# Patient Record
Sex: MALE | Race: WHITE | NOT HISPANIC OR LATINO | Employment: FULL TIME | URBAN - METROPOLITAN AREA
[De-identification: names, ages, dates, MRNs, and addresses within clinical notes are randomized per-mention and may not be internally consistent; named-entity substitution may affect disease eponyms.]

---

## 2017-05-24 ENCOUNTER — TELEPHONE (OUTPATIENT)
Dept: INFECTIOUS DISEASES | Facility: CLINIC | Age: 27
End: 2017-05-24

## 2017-05-24 ENCOUNTER — LAB VISIT (OUTPATIENT)
Dept: LAB | Facility: HOSPITAL | Age: 27
End: 2017-05-24
Attending: INTERNAL MEDICINE
Payer: COMMERCIAL

## 2017-05-24 DIAGNOSIS — Z20.6 HIV EXPOSURE: Primary | ICD-10-CM

## 2017-05-24 DIAGNOSIS — Z20.6 HIV EXPOSURE: ICD-10-CM

## 2017-05-24 LAB — RPR SER QL: NORMAL

## 2017-05-24 PROCEDURE — 87536 HIV-1 QUANT&REVRSE TRNSCRPJ: CPT

## 2017-05-24 PROCEDURE — 86703 HIV-1/HIV-2 1 RESULT ANTBDY: CPT

## 2017-05-24 PROCEDURE — 86592 SYPHILIS TEST NON-TREP QUAL: CPT

## 2017-05-24 PROCEDURE — 86780 TREPONEMA PALLIDUM: CPT

## 2017-05-24 PROCEDURE — 36415 COLL VENOUS BLD VENIPUNCTURE: CPT

## 2017-05-24 NOTE — TELEPHONE ENCOUNTER
Mr. Miller is a 27 y/o male whose male partner is has HIV and is a patient of mine.  He reports doing a whom HIV test that had a faint line in the positive section.  He is concerned about this.  I counseled him and advised him that we should repeat the test.      Plan  1.  Repeat HIV tests.  Schedule in [place].

## 2017-05-25 LAB — HIV 1+2 AB+HIV1 P24 AG SERPL QL IA: NEGATIVE

## 2017-05-30 LAB
HIV UQ DATE RECEIVED: NORMAL
HIV UQ DATE REPORTED: NORMAL
HIV1 RNA # SERPL NAA+PROBE: <40 COPIES/ML
HIV1 RNA SERPL NAA+PROBE-LOG#: <1.6 LOG (10) COPIES/ML
HIV1 RNA SERPL QL NAA+PROBE: NOT DETECTED

## 2017-05-31 LAB — T PALLIDUM AB SER QL IF: NORMAL

## 2017-06-15 ENCOUNTER — OFFICE VISIT (OUTPATIENT)
Dept: INFECTIOUS DISEASES | Facility: CLINIC | Age: 27
End: 2017-06-15
Payer: COMMERCIAL

## 2017-06-15 VITALS
SYSTOLIC BLOOD PRESSURE: 134 MMHG | DIASTOLIC BLOOD PRESSURE: 61 MMHG | HEIGHT: 70 IN | HEART RATE: 93 BPM | BODY MASS INDEX: 24.2 KG/M2 | WEIGHT: 169 LBS | TEMPERATURE: 98 F

## 2017-06-15 DIAGNOSIS — Z11.3 SCREEN FOR STD (SEXUALLY TRANSMITTED DISEASE): ICD-10-CM

## 2017-06-15 DIAGNOSIS — Z23 IMMUNIZATION DUE: ICD-10-CM

## 2017-06-15 DIAGNOSIS — Z20.6 HIV EXPOSURE: Primary | ICD-10-CM

## 2017-06-15 PROCEDURE — 99203 OFFICE O/P NEW LOW 30 MIN: CPT | Mod: S$GLB,,, | Performed by: INTERNAL MEDICINE

## 2017-06-15 PROCEDURE — 99999 PR PBB SHADOW E&M-EST. PATIENT-LVL III: CPT | Mod: PBBFAC,,, | Performed by: INTERNAL MEDICINE

## 2017-06-15 RX ORDER — EMTRICITABINE AND TENOFOVIR DISOPROXIL FUMARATE 200; 300 MG/1; MG/1
1 TABLET, FILM COATED ORAL DAILY
Qty: 30 TABLET | Refills: 3 | Status: SHIPPED | OUTPATIENT
Start: 2017-06-15 | End: 2017-10-13

## 2017-06-15 NOTE — PROGRESS NOTES
Subjective:      Patient ID: Alejandra Miller is a 26 y.o. male.    Chief Complaint:Follow-up      History of Present Illness    Mr. Miller is a 27 y/o cle[place] male with no significant past medical history.  He is involved in a relationship with an HIV positive partner.  He is here today to discuss PREP.  He was born and raised in [place].  He came to the [place] to attend college and has been her since.  He will be traveling to [place] for a visit from July 2 to July 18.    Review of Systems   Constitution: Negative for chills, decreased appetite, fever, weakness, malaise/fatigue, night sweats, weight gain and weight loss.   HENT: Negative for congestion, ear pain, headaches, hearing loss, hoarse voice, sore throat and tinnitus.    Eyes: Negative for blurred vision, redness and visual disturbance.   Cardiovascular: Negative for chest pain, leg swelling and palpitations.   Respiratory: Negative for cough, hemoptysis, shortness of breath and sputum production.    Hematologic/Lymphatic: Negative for adenopathy. Does not bruise/bleed easily.   Skin: Negative for dry skin, itching, rash and suspicious lesions.   Musculoskeletal: Negative for back pain, joint pain, myalgias and neck pain.   Gastrointestinal: Negative for abdominal pain, constipation, diarrhea, heartburn, nausea and vomiting.   Genitourinary: Negative for dysuria, flank pain, frequency, hematuria, hesitancy and urgency.   Neurological: Negative for dizziness, numbness and paresthesias.   Psychiatric/Behavioral: Negative for depression and memory loss. The patient does not have insomnia and is not nervous/anxious.      Objective:   Physical Exam   Constitutional: He is oriented to person, place, and time. He appears well-developed and well-nourished. No distress.   HENT:   Head: Normocephalic and atraumatic.   Right Ear: External ear normal.   Left Ear: External ear normal.   Nose: Nose normal.   Mouth/Throat: Oropharynx is clear and moist. No oropharyngeal  exudate.   Eyes: Conjunctivae and EOM are normal. Pupils are equal, round, and reactive to light. Right eye exhibits no discharge. Left eye exhibits no discharge. No scleral icterus.   Neck: Normal range of motion. Neck supple. No JVD present. No tracheal deviation present. No thyromegaly present.   Cardiovascular: Normal rate, regular rhythm and intact distal pulses.  Exam reveals no gallop and no friction rub.    No murmur heard.  Pulmonary/Chest: Effort normal and breath sounds normal. No stridor. No respiratory distress. He has no wheezes. He has no rales. He exhibits no tenderness.   Abdominal: Soft. Bowel sounds are normal. He exhibits no distension and no mass. There is no tenderness. There is no rebound and no guarding.   Musculoskeletal: Normal range of motion. He exhibits no edema or tenderness.   Lymphadenopathy:     He has no cervical adenopathy.   Neurological: He is alert and oriented to person, place, and time. He has normal reflexes. He displays normal reflexes. No cranial nerve deficit. He exhibits normal muscle tone. Coordination normal.   Skin: Skin is warm. No rash noted. He is not diaphoretic. No erythema. No pallor.   Psychiatric: He has a normal mood and affect. His behavior is normal. Judgment and thought content normal.   Nursing note and vitals reviewed.    Assessment:       1. HIV exposure    2. Immunization due    3. Screen for STD (sexually transmitted disease)        27 y/o HIV negative [place] male in a relationship with an HIV positive partner who presents today to discuss PREP.  I counseled him on existing data regarding the safety and efficacy of Truvada when used for PREP.  I will repeat HIV testing again today and plan to start truvada afterwards.  Importance of continued condom use was stressed.    I will also screen him for hepatitis viruses with the aim of vaccinating him should be non-immune.  I will also plan to initiate HPV vaccination series at his next visit.    Plan:        HIV exposure  -     Hepatitis B core antibody, total; Future; Expected date: 06/15/2017  -     Hepatitis B surface antibody; Future; Expected date: 06/15/2017  -     Hepatitis B surface antigen; Future; Expected date: 06/15/2017  -     Hepatitis A antibody, IgG; Future; Expected date: 06/15/2017  -     HIV-1 and HIV-2 antibodies; Future; Expected date: 06/15/2017  -     HIV RNA, quantitative, PCR; Future; Expected date: 06/15/2017  -     emtricitabine-tenofovir 200-300 mg (TRUVADA) 200-300 mg Tab; Take 1 tablet by mouth once daily.  Dispense: 30 tablet; Refill: 3  -     Comprehensive metabolic panel; Future; Expected date: 06/15/2017  -     Comprehensive metabolic panel; Future; Expected date: 07/15/2017    Immunization due  -     Hepatitis B core antibody, total; Future; Expected date: 06/15/2017  -     Hepatitis B surface antibody; Future; Expected date: 06/15/2017  -     Hepatitis B surface antigen; Future; Expected date: 06/15/2017  -     Hepatitis A antibody, IgG; Future; Expected date: 06/15/2017  -     HIV-1 and HIV-2 antibodies; Future; Expected date: 06/15/2017  -     HIV RNA, quantitative, PCR; Future; Expected date: 06/15/2017    Screen for STD (sexually transmitted disease)  -     Hepatitis B core antibody, total; Future; Expected date: 06/15/2017  -     Hepatitis B surface antibody; Future; Expected date: 06/15/2017  -     Hepatitis B surface antigen; Future; Expected date: 06/15/2017  -     Hepatitis A antibody, IgG; Future; Expected date: 06/15/2017

## 2017-10-28 DIAGNOSIS — Z20.6 HIV EXPOSURE: ICD-10-CM

## 2017-10-30 RX ORDER — EMTRICITABINE AND TENOFOVIR DISOPROXIL FUMARATE 200; 300 MG/1; MG/1
1 TABLET, FILM COATED ORAL DAILY
Qty: 30 TABLET | Refills: 3 | OUTPATIENT
Start: 2017-10-30 | End: 2018-02-27

## 2022-07-18 ENCOUNTER — PATIENT MESSAGE (OUTPATIENT)
Dept: INTERNAL MEDICINE | Facility: CLINIC | Age: 32
End: 2022-07-18
Payer: COMMERCIAL

## 2022-08-18 ENCOUNTER — PATIENT MESSAGE (OUTPATIENT)
Dept: INTERNAL MEDICINE | Facility: CLINIC | Age: 32
End: 2022-08-18
Payer: COMMERCIAL